# Patient Record
Sex: FEMALE | Race: BLACK OR AFRICAN AMERICAN | URBAN - METROPOLITAN AREA
[De-identification: names, ages, dates, MRNs, and addresses within clinical notes are randomized per-mention and may not be internally consistent; named-entity substitution may affect disease eponyms.]

---

## 2025-03-04 ENCOUNTER — APPOINTMENT (OUTPATIENT)
Dept: CARDIOLOGY | Facility: HOSPITAL | Age: 35
End: 2025-03-04
Payer: COMMERCIAL

## 2025-03-04 ENCOUNTER — APPOINTMENT (OUTPATIENT)
Dept: RADIOLOGY | Facility: HOSPITAL | Age: 35
End: 2025-03-04
Payer: COMMERCIAL

## 2025-03-04 ENCOUNTER — HOSPITAL ENCOUNTER (EMERGENCY)
Facility: HOSPITAL | Age: 35
Discharge: HOME | End: 2025-03-04
Attending: EMERGENCY MEDICINE
Payer: COMMERCIAL

## 2025-03-04 VITALS
RESPIRATION RATE: 16 BRPM | HEART RATE: 85 BPM | SYSTOLIC BLOOD PRESSURE: 102 MMHG | TEMPERATURE: 98.4 F | DIASTOLIC BLOOD PRESSURE: 64 MMHG | BODY MASS INDEX: 28.35 KG/M2 | OXYGEN SATURATION: 99 % | WEIGHT: 155 LBS

## 2025-03-04 DIAGNOSIS — I95.1 SYNCOPE DUE TO ORTHOSTATIC HYPOTENSION: Primary | ICD-10-CM

## 2025-03-04 LAB
ALBUMIN SERPL BCP-MCNC: 4.4 G/DL (ref 3.4–5)
ALP SERPL-CCNC: 53 U/L (ref 33–110)
ALT SERPL W P-5'-P-CCNC: 10 U/L (ref 7–45)
ANION GAP SERPL CALC-SCNC: 10 MMOL/L (ref 10–20)
AST SERPL W P-5'-P-CCNC: 11 U/L (ref 9–39)
B-HCG SERPL-ACNC: <2 MIU/ML
BASOPHILS # BLD AUTO: 0.05 X10*3/UL (ref 0–0.1)
BASOPHILS NFR BLD AUTO: 0.4 %
BILIRUB SERPL-MCNC: 0.4 MG/DL (ref 0–1.2)
BUN SERPL-MCNC: 7 MG/DL (ref 6–23)
CALCIUM SERPL-MCNC: 9.4 MG/DL (ref 8.6–10.3)
CARDIAC TROPONIN I PNL SERPL HS: <3 NG/L (ref 0–13)
CHLORIDE SERPL-SCNC: 106 MMOL/L (ref 98–107)
CO2 SERPL-SCNC: 29 MMOL/L (ref 21–32)
CREAT SERPL-MCNC: 0.69 MG/DL (ref 0.5–1.05)
EGFRCR SERPLBLD CKD-EPI 2021: >90 ML/MIN/1.73M*2
EOSINOPHIL # BLD AUTO: 0.02 X10*3/UL (ref 0–0.7)
EOSINOPHIL NFR BLD AUTO: 0.2 %
ERYTHROCYTE [DISTWIDTH] IN BLOOD BY AUTOMATED COUNT: 11.9 % (ref 11.5–14.5)
GLUCOSE SERPL-MCNC: 98 MG/DL (ref 74–99)
HCT VFR BLD AUTO: 37.1 % (ref 36–46)
HGB BLD-MCNC: 12.6 G/DL (ref 12–16)
IMM GRANULOCYTES # BLD AUTO: 0.04 X10*3/UL (ref 0–0.7)
IMM GRANULOCYTES NFR BLD AUTO: 0.3 % (ref 0–0.9)
LYMPHOCYTES # BLD AUTO: 1.61 X10*3/UL (ref 1.2–4.8)
LYMPHOCYTES NFR BLD AUTO: 12.3 %
MCH RBC QN AUTO: 31.7 PG (ref 26–34)
MCHC RBC AUTO-ENTMCNC: 34 G/DL (ref 32–36)
MCV RBC AUTO: 93 FL (ref 80–100)
MONOCYTES # BLD AUTO: 0.64 X10*3/UL (ref 0.1–1)
MONOCYTES NFR BLD AUTO: 4.9 %
NEUTROPHILS # BLD AUTO: 10.74 X10*3/UL (ref 1.2–7.7)
NEUTROPHILS NFR BLD AUTO: 81.9 %
NRBC BLD-RTO: 0 /100 WBCS (ref 0–0)
PLATELET # BLD AUTO: 430 X10*3/UL (ref 150–450)
POTASSIUM SERPL-SCNC: 4 MMOL/L (ref 3.5–5.3)
PROT SERPL-MCNC: 7.3 G/DL (ref 6.4–8.2)
RBC # BLD AUTO: 3.98 X10*6/UL (ref 4–5.2)
SODIUM SERPL-SCNC: 141 MMOL/L (ref 136–145)
WBC # BLD AUTO: 13.1 X10*3/UL (ref 4.4–11.3)

## 2025-03-04 PROCEDURE — 96361 HYDRATE IV INFUSION ADD-ON: CPT

## 2025-03-04 PROCEDURE — 36415 COLL VENOUS BLD VENIPUNCTURE: CPT | Performed by: EMERGENCY MEDICINE

## 2025-03-04 PROCEDURE — 99285 EMERGENCY DEPT VISIT HI MDM: CPT | Mod: 25

## 2025-03-04 PROCEDURE — 2500000004 HC RX 250 GENERAL PHARMACY W/ HCPCS (ALT 636 FOR OP/ED): Performed by: EMERGENCY MEDICINE

## 2025-03-04 PROCEDURE — 93005 ELECTROCARDIOGRAM TRACING: CPT

## 2025-03-04 PROCEDURE — 80053 COMPREHEN METABOLIC PANEL: CPT | Performed by: EMERGENCY MEDICINE

## 2025-03-04 PROCEDURE — 84702 CHORIONIC GONADOTROPIN TEST: CPT | Performed by: EMERGENCY MEDICINE

## 2025-03-04 PROCEDURE — 96374 THER/PROPH/DIAG INJ IV PUSH: CPT

## 2025-03-04 PROCEDURE — 70450 CT HEAD/BRAIN W/O DYE: CPT | Performed by: STUDENT IN AN ORGANIZED HEALTH CARE EDUCATION/TRAINING PROGRAM

## 2025-03-04 PROCEDURE — 85025 COMPLETE CBC W/AUTO DIFF WBC: CPT | Performed by: EMERGENCY MEDICINE

## 2025-03-04 PROCEDURE — 96375 TX/PRO/DX INJ NEW DRUG ADDON: CPT

## 2025-03-04 PROCEDURE — 84484 ASSAY OF TROPONIN QUANT: CPT | Performed by: EMERGENCY MEDICINE

## 2025-03-04 PROCEDURE — 70450 CT HEAD/BRAIN W/O DYE: CPT

## 2025-03-04 RX ORDER — METOCLOPRAMIDE HYDROCHLORIDE 5 MG/ML
10 INJECTION INTRAMUSCULAR; INTRAVENOUS ONCE
Status: COMPLETED | OUTPATIENT
Start: 2025-03-04 | End: 2025-03-04

## 2025-03-04 RX ORDER — DIPHENHYDRAMINE HYDROCHLORIDE 50 MG/ML
25 INJECTION INTRAMUSCULAR; INTRAVENOUS ONCE
Status: COMPLETED | OUTPATIENT
Start: 2025-03-04 | End: 2025-03-04

## 2025-03-04 RX ORDER — KETOROLAC TROMETHAMINE 30 MG/ML
15 INJECTION, SOLUTION INTRAMUSCULAR; INTRAVENOUS ONCE
Status: COMPLETED | OUTPATIENT
Start: 2025-03-04 | End: 2025-03-04

## 2025-03-04 RX ADMIN — SODIUM CHLORIDE 1000 ML: 9 INJECTION, SOLUTION INTRAVENOUS at 18:55

## 2025-03-04 RX ADMIN — KETOROLAC TROMETHAMINE 15 MG: 30 INJECTION, SOLUTION INTRAMUSCULAR at 19:01

## 2025-03-04 RX ADMIN — METOCLOPRAMIDE 10 MG: 5 INJECTION, SOLUTION INTRAMUSCULAR; INTRAVENOUS at 19:00

## 2025-03-04 RX ADMIN — DIPHENHYDRAMINE HYDROCHLORIDE 25 MG: 50 INJECTION, SOLUTION INTRAMUSCULAR; INTRAVENOUS at 19:02

## 2025-03-04 ASSESSMENT — PAIN DESCRIPTION - PAIN TYPE: TYPE: ACUTE PAIN

## 2025-03-04 ASSESSMENT — PAIN - FUNCTIONAL ASSESSMENT
PAIN_FUNCTIONAL_ASSESSMENT: 0-10
PAIN_FUNCTIONAL_ASSESSMENT: 0-10

## 2025-03-04 ASSESSMENT — COLUMBIA-SUICIDE SEVERITY RATING SCALE - C-SSRS
2. HAVE YOU ACTUALLY HAD ANY THOUGHTS OF KILLING YOURSELF?: NO
6. HAVE YOU EVER DONE ANYTHING, STARTED TO DO ANYTHING, OR PREPARED TO DO ANYTHING TO END YOUR LIFE?: NO
1. IN THE PAST MONTH, HAVE YOU WISHED YOU WERE DEAD OR WISHED YOU COULD GO TO SLEEP AND NOT WAKE UP?: NO

## 2025-03-04 ASSESSMENT — PAIN SCALES - GENERAL
PAINLEVEL_OUTOF10: 5 - MODERATE PAIN
PAINLEVEL_OUTOF10: 4

## 2025-03-04 ASSESSMENT — PAIN DESCRIPTION - LOCATION: LOCATION: HEAD

## 2025-03-04 NOTE — ED TRIAGE NOTES
Pt here states passed out and hit head and vomited. Pt states felt lightheaded prior to the eppisode.

## 2025-03-04 NOTE — ED TRIAGE NOTES
TRIAGE NOTE   I saw the patient as the Clinician in Triage and performed a brief history and physical exam, established acuity, and ordered appropriate tests to develop basic plan of care. Patient will be seen by an [facility], resident and/or physician who will independently evaluate the patient. Please see subsequent provider notes for further details and disposition.     Chief complaint: Syncopal episode    History of present illness: Patient is a 34-year-old female who denies any significant past medical history presenting to the emergency department complaints of a syncopal episode.  According to the patient, at approximately 4:00 this afternoon, the patient had a onset of lightheadedness.  The patient states that this happened approximately 20 minutes after she ate something.  Patient states that she felt she had to go to the bathroom but did not do so.  The patient went to the bathroom.  The patient states that at that time she lost consciousness she believes that she hit her head on something in the bathroom.  Patient states that initially the pain was in the back of her head and now is diffuse.  The patient has never had symptoms like this in the past she denies any recent travel she denies any hormone use.  The patient denies any pain or swelling her legs.  Concern for this, the patient presents to the emergency department for further evaluation.    Physical examination: General: Patient is an age-appropriate well-appearing female resting comfortably in the examination table  Cardiovascular: Patient has a regular tachycardia  Lungs: Lungs are clear to auscultation bilaterally  Abdomen: Patient's abdomen is soft nontender nondistended.  Patient has normal bowel sounds. There is no guarding or rebound tenderness.  Neuro: Patient is alert she moves all 4 extremities spontaneously there are no lateralizing neurologic deficits cranial nerves III through XII are intact.    Medical Decision Making: Patient presents  to the emergency department complaints of a syncopal episode.  At this time, the patient appears well I ordered the patient supplemental fluids and a migraine cocktail given her headache.  I also ordered a workup including CBC Chem-7 troponin and a CAT scan of the head given her headache.  I reassured the patient however that at this time she appears well and her symptoms and presentation are consistent with a vasovagal syncopal episode presuming a normal workup.

## 2025-03-04 NOTE — Clinical Note
Brenda Lucas was seen and treated in our emergency department on 3/4/2025.  She may return to work on 03/06/2025.       If you have any questions or concerns, please don't hesitate to call.      Sameer Sutton MD

## 2025-03-05 LAB
ATRIAL RATE: 106 BPM
P AXIS: 64 DEGREES
P OFFSET: 207 MS
P ONSET: 156 MS
PR INTERVAL: 130 MS
Q ONSET: 221 MS
QRS COUNT: 17 BEATS
QRS DURATION: 80 MS
QT INTERVAL: 326 MS
QTC CALCULATION(BAZETT): 433 MS
QTC FREDERICIA: 393 MS
R AXIS: 79 DEGREES
T AXIS: 60 DEGREES
T OFFSET: 384 MS
VENTRICULAR RATE: 106 BPM

## 2025-03-05 NOTE — ED PROVIDER NOTES
Chief Complaint   Patient presents with    Syncope     History of Present Illness   Brenda Lucas   MRN [id number]    34-year-old presents for evaluation of loss of consciousness.  Around 4 PM today she was at home and walks to the bathroom from another room because she felt lightheaded and was worried she was going to pass out and intending to lie on the bathroom floor.  Described rush of warmth, vision closing in, nausea, lightheadedness.  Reportedly did lose consciousness and fell from standing position and head struck the sink.  Patient reports that feeling member witnessed at least part of the episode and described mild shaking for several seconds.  Quickly regained consciousness and was not confused.  No tongue biting or incontinence.  Did not have headache prior to striking head on sink.  No heart racing, chest pain, shortness of breath, abdominal pain, extremity weakness or numbness.  Reports no recent fever or illness.  Describes an episode of loss of consciousness with lightheadedness 6 years ago while she was pregnant.  Reports no chronic medical conditions or daily medications.    Physical Exam   T 36.9 °C (98.4 °F)  HR (!) 106  BP 99/70  RR 18  O2 97 %      General:  No acute distress.  Head: Atraumatic.  Eyes: No conjunctival injection.   Ears Nose Throat: No epistaxis. Oral mucosa moist.  Neck: Supple.  Cardiovascular: Extremities warm.  Regular rhythm.  No murmur.  No JVD.  No lower leg edema.  Pulmonary: No stridor. Normal respiratory effort.  Normal air entry.  No conversational dyspnea.  Abdominal: No distention.  Soft and nontender.  Musculoskeletal: No deformity or joint swelling.  Skin: No rash.  Psychiatric: Does not appear to respond to internal stimuli.  Neurologic: Alert. Follows directions. Extraocular movements intact. Visual fields intact by finger counting. No nystagmus. Facial sensation to light touch intact. Eyebrows and corners of the mouth elevate symmetrically. Tongue  protrudes midline. No aphasia or dysarthria. Strength 5/5 upper and lower extremities. Extremity sensation to light touch intact. No finger nose dysmetria. Intact rapid alternating hand movements. Ambulates with steady gait.  NIH stroke scale 0.    ED Course & Medical Decision Making   Triage vital signs notable for heart rate 106.  Blood pressure 99/70 compared to 118 over 72 years ago and 117/76 4 years ago.  Patient initially seen by provider in triage who ordered CT head given report of headache and head strike as well as labs, Toradol, Reglan, Benadryl, intravenous fluids.  Labs demonstrated mild leukocytosis otherwise reassuring with normal renal function and electrolytes.  Negative beta-hCG and undetectable high-sensitivity troponin.  EKG reassuring without ischemic changes or arrhythmia.  CT head demonstrated no acute intracranial hemorrhage.  After receiving medications and finishing intravenous fluids patient reports that symptoms are entirely resolved.  Neurologic exam normal and ambulated with steady gait.  Discussed with patient that I have low suspicion for arrhythmia, seizure or other dangerous cause of symptoms.  Description most consistent with orthostatic hypotension leading to loss of consciousness.  Patient ate solid food and drink fluids.  Blood pressure before discharge improved 102/64 and heart rate 85.  Encouraged to return for reassessment if new or worsening symptoms.  Discharged in good condition.    ED Course as of 03/04/25 2042   Tue Mar 04, 2025   1900 ECG 12 lead  Interpreted by me.  3/4/2025 1814.  Sinus tachycardia 106 bpm.  , QRS 80, QTc 433.  No ST elevation.  No LVH, Brugada, delta wave. [MC]      ED Course User Index  [MC] Sameer Sutton MD         Diagnoses as of 03/04/25 2042   Syncope due to orthostatic hypotension            Sameer Sutton MD  03/04/25 0958

## 2025-03-05 NOTE — DISCHARGE INSTRUCTIONS
CT head was reassuring.  Please drink plenty of fluids to stay well-hydrated.  Return to emergency department for reassessment if new or worsening symptoms.